# Patient Record
Sex: MALE | URBAN - METROPOLITAN AREA
[De-identification: names, ages, dates, MRNs, and addresses within clinical notes are randomized per-mention and may not be internally consistent; named-entity substitution may affect disease eponyms.]

---

## 2017-01-25 ENCOUNTER — OFFICE VISIT (OUTPATIENT)
Dept: HEMATOLOGY | Age: 57
End: 2017-01-25

## 2017-01-25 VITALS
HEIGHT: 75 IN | DIASTOLIC BLOOD PRESSURE: 73 MMHG | TEMPERATURE: 96.3 F | WEIGHT: 146.5 LBS | HEART RATE: 69 BPM | SYSTOLIC BLOOD PRESSURE: 98 MMHG | BODY MASS INDEX: 18.21 KG/M2 | OXYGEN SATURATION: 96 %

## 2017-01-25 DIAGNOSIS — B18.2 CHRONIC HEPATITIS C WITHOUT HEPATIC COMA (HCC): Primary | ICD-10-CM

## 2017-01-25 NOTE — MR AVS SNAPSHOT
Visit Information Date & Time Provider Department Dept. Phone Encounter #  
 1/25/2017  2:00 PM Adair Scruggs NP [facility] [address] [id number] Follow-up Instructions Return in about 8 weeks (around 3/22/2017). Your Appointments 3/22/2017  1:30 PM  
Follow Up with Adair Scruggs NP [facility] [address] ([facility] Appt Note: follow up [address] [facility] [place] [phone] [facility] [address]  
[address] [facility] [place] [address] Upcoming Health Maintenance Date Due Pneumococcal 19-64 Highest Risk (1 of 3 - PCV13) 6/16/1977 DTaP/Tdap/Td series (1 - Tdap) 6/16/1979 FOBT Q 1 YEAR AGE 50-75 6/16/2008 INFLUENZA AGE 9 TO ADULT 8/1/2016 Allergies as of 1/25/2017  Review Complete On: 1/25/2017 By: Adair Scruggs NP Severity Noted Reaction Type Reactions Demerol [Meperidine]  10/03/2016    Nausea and Vomiting Current Immunizations  Never Reviewed No immunizations on file. Not reviewed this visit You Were Diagnosed With   
  
 Codes Comments Chronic hepatitis C without hepatic coma (HCC)    -  Primary ICD-10-CM: B18.2 ICD-9-CM: 070.54 Vitals BP Pulse Temp Height(growth percentile) Weight(growth percentile) SpO2  
 98/73 69 96.3 °F (35.7 °C) (Oral) 6' 3\" (1.905 m) 146 lb 8 oz (66.5 kg) 96% BMI Smoking Status 18.31 kg/m2 Never Smoker BMI and BSA Data Body Mass Index Body Surface Area  
 18.31 kg/m 2 1.88 m 2 Your Updated Medication List  
  
   
This list is accurate as of: 1/25/17  2:24 PM.  Always use your most recent med list.  
  
  
  
  
 EFFEXOR XR 75 mg capsule Generic drug:  venlafaxine-SR Take  by mouth daily. methadone 40 mg soluble tablet Commonly known as:  Malia Ends Take 40 mg by mouth every four (4) hours as needed for Pain. VALIUM 5 mg tablet Generic drug:  diazePAM  
Take 5 mg by mouth every six (6) hours as needed for Anxiety. XANAX 2 mg tablet Generic drug:  ALPRAZolam  
Take  by mouth. We Performed the Following CBC WITH AUTOMATED DIFF [12648 CPT(R)] HCV, QT WITH GRAPH [id number] CPT(R)] HEPATIC FUNCTION PANEL [87601 CPT(R)] METABOLIC PANEL, BASIC [53801 CPT(R)] Follow-up Instructions Return in about 8 weeks (around 3/22/2017). Introducing [facility]! Sarah Webster introduces [employer] patient portal. Now you can access parts of your medical record, email your doctor's office, and request medication refills online. 1. In your internet browser, go to [email or web address] "[email or web address]/Share Your Brain 2. Click on the First Time User? Click Here link in the Sign In box. You will see the New Member Sign Up page. 3. Enter your [employer] Access Code exactly as it appears below. You will not need to use this code after youve completed the sign-up process. If you do not sign up before the expiration date, you must request a new code. · [employer] Access Code: [email or web address] Expires: 4/25/2017  2:23 PM 
 
4. Enter the last four digits of your Social Security Number (xxxx) and Date of Birth (mm/dd/yyyy) as indicated and click Submit. You will be taken to the next sign-up page. 5. Create a [employer] ID. This will be your [employer] login ID and cannot be changed, so think of one that is secure and easy to remember. 6. Create a [employer] password. You can change your password at any time. 7. Enter your Password Reset Question and Answer. This can be used at a later time if you forget your password. 8. Enter your e-mail address. You will receive e-mail notification when new information is available in [address] 9. Click Sign Up. You can now view and download portions of your medical record. 10. Click the Download Summary menu link to download a portable copy of your medical information. If you have questions, please visit the Frequently Asked Questions section of the [employer] website. Remember, [employer] is NOT to be used for urgent needs. For medical emergencies, dial 911. Now available from your iPhone and Android! Please provide this summary of care documentation to your next provider. Your primary care clinician is listed as [id number] Mara MCDOWELL. If you have any questions after today's visit, please call [phone].

## 2017-01-25 NOTE — PROGRESS NOTES
93 Saniya Chicas MD, RICK Morales PA-C Sloan Blade, MD, [place], Laymon Berkshire, MD Amy Maeola Romance, NP Erving Rinne, NP        [address] [facility], [address] [address]  22.     [phone]     FAX: 671 [facility], [address] [place] [address], [address]     [phone]     FAX: [phone]       Patient Care Team:  Susan Christy MD as PCP - General (Nephrology)      Problem List  Date Reviewed: 1/25/2017          Codes Class Noted    Chronic hepatitis C without hepatic coma [facility]) ICD-10-CM: B18.2  ICD-9-CM: 070.54  10/3/2016        TMJ (dislocation of temporomandibular joint) ICD-10-CM: S03. 00XA  ICD-9-CM: 830.0  10/3/2016        Migraine headache ICD-10-CM: X42.103  ICD-9-CM: 346.90  10/3/2016        Melanoma in situ of interscapular region [facility]) ICD-10-CM: D03.59  ICD-9-CM: 172.5  10/3/2016              Ilana Borges returns to the [place] [address] today for education and management of chronic hepatitis C. He began HCV on 12/27/2016 with once daily Harvoni. He will be treated for 8 weeks total.  He is receiving the treatment medication through the [facility] study program.  The HCV has not been treated before this. The active problem list, all pertinent past medical history, medications, liver histology, endoscopic studies, radiologic findings and laboratory findings related to the liver disorder were reviewed with the patient. The patient is a 60 y.o.  male who was noted to have abnormalities in liver chemistries and subsequently tested positive for chronic HCV in 1998. Risk factors for acquiring HCV are IV drug use in 1970 - 2003. There was no history of acute incteric hepatitis at the time of these risk factors. Imaging of the liver was not performed.       A liver biopsy has not been performed. In office fibroscan assessment suggests fairly mild disease, F1/F2, portal fibrosis      The most recent laboratory studies indicate that the liver transaminases are normal, alkaline phosphatase is normal, tests of hepatic synthetic and metabolic function are normal, and the platelet count is normal.      The patient has no complaints which can be attributed to liver disease. The patient completes all daily activities without any functional limitations. The patient has not experienced fatigue, fevers, chills, shortness of breath, chest pain, pain in the right side over the liver, diffuse abdominal pain, nausea, vomiting, constipation, diarrhrea, dry eyes, dry mouth, arthralgias, myalgias, yellowing of the eyes or skin, itching, dark urine, problems concentrating, swelling of the abdomen, swelling of the lower extremities, hematemesis, or hematochezia. ALLERGIES  Allergies   Allergen Reactions    Demerol [Meperidine] Nausea and Vomiting       MEDICATIONS  Current Outpatient Prescriptions   Medication Sig    diazepam (VALIUM) 5 mg tablet Take 5 mg by mouth every six (6) hours as needed for Anxiety.  ALPRAZolam (XANAX) 2 mg tablet Take  by mouth.  venlafaxine-SR (EFFEXOR XR) 75 mg capsule Take  by mouth daily.  methadone (METHADOSE) 40 mg soluble tablet Take 40 mg by mouth every four (4) hours as needed for Pain. No current facility-administered medications for this visit. SYSTEM REVIEW NOT RELATED TO LIVER DISEASE OR REVIEWED ABOVE:  Constitution systems: Negative for fever, chills, weight gain, weight loss. Eyes: Negative for visual changes. ENT: Negative for sore throat, painful swallowing. Respiratory: Negative for cough, hemoptysis, SOB. Cardiology: Negative for chest pain, palpitations. GI:  Negative for constipation or diarrhea. : Negative for urinary frequency, dysuria, hematuria, nocturia. Skin: Negative for rash.   Hematology: Negative for easy bruising, blood clots. Musculo-skelatal: Negative for back pain, muscle pain, weakness. Neurologic: Negative for headaches, dizziness, vertigo, memory problems not related to HE. Psychology: Negative for anxiety, depression. FAMILY HISTORY:  The father  of brain cancer. The mother  of heart disease. There is no family history of liver disease. SOCIAL HISTORY:  The patient is . The patient has 1 child. The patient stopped using tobacco products in . The patient has never consumed significant amounts of alcohol. The patient currently works part time as a . PHYSICAL EXAMINATION:  Visit Vitals    BP 98/73    Pulse 69    Temp 96.3 °F (35.7 °C) (Oral)    Ht 6' 3\" (1.905 m)    Wt 146 lb 8 oz (66.5 kg)    SpO2 96%    BMI 18.31 kg/m2     General: No acute distress. Eyes: Sclera anicteric. ENT: No oral lesions. Thyroid normal.  Nodes: No adenopathy. Skin: No spider angiomata. No jaundice. No palmar erythema. Respiratory: Lungs clear to auscultation. Cardiovascular: Regular heart rate. No murmurs. No JVD. Abdomen: Soft non-tender. Liver size normal to percussion/palpation. Spleen not palpable. No obvious ascites. Extremities: No edema. No muscle wasting. No gross arthritic changes. Neurologic: Alert and oriented. Cranial nerves grossly intact. No asterixis.     LABORATORY STUDIES:  [facility] Ref Rng 10/3/2016   WBC 3.4 - 10.8 x10E3/uL 8.1   ANC 1.4 - 7.0 x10E3/uL 4.0   HGB 12.6 - 17.7 g/dL 14.1    - 379 x10E3/uL 290   AST 0 - 40 IU/L 26   ALT 0 - 44 IU/L 27   Alk Phos 39 - 117 IU/L 109   Bili, Total 0.0 - 1.2 mg/dL 0.5   Bili, Direct 0.00 - 0.40 mg/dL 0.16   Albumin 3.5 - 5.5 g/dL 4.5   BUN 6 - 24 mg/dL 12   Creat 0.76 - 1.27 mg/dL 0.93   Na 134 - 144 mmol/L 142   K 3.5 - 5.2 mmol/L 5.0   Cl 97 - 108 mmol/L 97   CO2 18 - 29 mmol/L 29   Glucose 65 - 99 mg/dL 92     SEROLOGIES:  Serologies Latest Ref Rng 10/3/2016   Hep B Surface Ag Negative Negative   Hep C Genotype  1b   HCV RT-PCR, Quant IU/mL [id number]     LIVER HISTOLOGY:  10/2016. FibroScan performed at [address] EkPa was 7.9. Suggested fibrosis level is F1/F2. ENDOSCOPIC PROCEDURES:  Not available or performed    RADIOLOGY:  Not available or performed    OTHER TESTING:  Not available or performed    ASSESSMENT AND PLAN:  Chronic HCV with portal fibrosis. The most recent laboratory studies indicate that the liver transaminases are normal, alkaline phosphatase is elevated, tests of hepatic synthetic and metabolic function are normal, and the platelet count is normal. Will perform laboratory testing to monitor liver function and degree of liver injury. This will include hepatic panel, a CBC w/ diff, a BMP, and HCV RNA.    HCV. The patient has genotype 1B and has not been treated for HCV before. He began HCV on 12/27/2016 with once daily Harvoni. He will be treated for 8 weeks total.  He is receiving the treatment medication through the [facility] study program.  He has no treatment related complaints. In fact, he stated \"I feel better than I have in years\".    The patient was directed to continue all current medications at the current dosages. There are no contraindications for the patient to take any medications that are necessary for treatment of other medical issues. The patient was instructed not to start any PPI while on treatment. The patient was instructed not to take any antacids within 4 hours of taking the Harvoni. The patient verbalized understanding of these instructions.     The patient was counseled regarding alcohol consumption.      Vaccination for viral hepatitis B is not required. The patient has serologic evidence of prior exposure or vaccination with immunity.     All of the above issues were discussed with the patient. All questions were answered.  The patient expressed a clear understanding of the above.     Follow-up Fred Palmer 32 in 8 weeks to assess for SVR 4.      Iris Lopes, FNP-C   Liver [facility]/ [address], [facility], [facility], [address] [place] [address]  [phone]

## 2017-01-26 LAB
ALBUMIN SERPL-MCNC: 4 G/DL (ref 3.5–5.5)
ALP SERPL-CCNC: 101 IU/L (ref 39–117)
ALT SERPL-CCNC: 11 IU/L (ref 0–44)
AST SERPL-CCNC: 17 IU/L (ref 0–40)
BASOPHILS # BLD AUTO: 0 X10E3/UL (ref 0–0.2)
BASOPHILS NFR BLD AUTO: 0 %
BILIRUB DIRECT SERPL-MCNC: 0.09 MG/DL (ref 0–0.4)
BILIRUB SERPL-MCNC: <0.2 MG/DL (ref 0–1.2)
BUN SERPL-MCNC: 15 MG/DL (ref 6–24)
BUN/CREAT SERPL: 17 (ref 9–20)
CALCIUM SERPL-MCNC: 9.1 MG/DL (ref 8.7–10.2)
CHLORIDE SERPL-SCNC: 99 MMOL/L (ref 96–106)
CO2 SERPL-SCNC: 29 MMOL/L (ref 18–29)
CREAT SERPL-MCNC: 0.88 MG/DL (ref 0.76–1.27)
EOSINOPHIL # BLD AUTO: 0.3 X10E3/UL (ref 0–0.4)
EOSINOPHIL NFR BLD AUTO: 4 %
ERYTHROCYTE [DISTWIDTH] IN BLOOD BY AUTOMATED COUNT: 13.5 % (ref 12.3–15.4)
GLUCOSE SERPL-MCNC: 88 MG/DL (ref 65–99)
HCT VFR BLD AUTO: 38.4 % (ref 37.5–51)
HGB BLD-MCNC: 12.7 G/DL (ref 12.6–17.7)
IMM GRANULOCYTES # BLD: 0 X10E3/UL (ref 0–0.1)
IMM GRANULOCYTES NFR BLD: 0 %
LYMPHOCYTES # BLD AUTO: 2.7 X10E3/UL (ref 0.7–3.1)
LYMPHOCYTES NFR BLD AUTO: 35 %
MCH RBC QN AUTO: 29.2 PG (ref 26.6–33)
MCHC RBC AUTO-ENTMCNC: 33.1 G/DL (ref 31.5–35.7)
MCV RBC AUTO: 88 FL (ref 79–97)
MONOCYTES # BLD AUTO: 0.8 X10E3/UL (ref 0.1–0.9)
MONOCYTES NFR BLD AUTO: 11 %
NEUTROPHILS # BLD AUTO: 3.7 X10E3/UL (ref 1.4–7)
NEUTROPHILS NFR BLD AUTO: 50 %
PLATELET # BLD AUTO: 260 X10E3/UL (ref 150–379)
POTASSIUM SERPL-SCNC: 4.6 MMOL/L (ref 3.5–5.2)
PROT SERPL-MCNC: 6.7 G/DL (ref 6–8.5)
RBC # BLD AUTO: 4.35 X10E6/UL (ref 4.14–5.8)
SODIUM SERPL-SCNC: 142 MMOL/L (ref 134–144)
WBC # BLD AUTO: 7.6 X10E3/UL (ref 3.4–10.8)

## 2017-01-27 LAB
HCV RNA SERPL NAA+PROBE-ACNC: NORMAL IU/ML
TEST INFORMATION: NORMAL

## 2017-01-30 ENCOUNTER — TELEPHONE (OUTPATIENT)
Dept: HEMATOLOGY | Age: 57
End: 2017-01-30

## 2017-03-22 ENCOUNTER — OFFICE VISIT (OUTPATIENT)
Dept: HEMATOLOGY | Age: 57
End: 2017-03-22

## 2017-03-22 VITALS
BODY MASS INDEX: 17.35 KG/M2 | HEART RATE: 68 BPM | DIASTOLIC BLOOD PRESSURE: 70 MMHG | OXYGEN SATURATION: 98 % | WEIGHT: 138.8 LBS | SYSTOLIC BLOOD PRESSURE: 107 MMHG

## 2017-03-22 DIAGNOSIS — B18.2 CHRONIC HEPATITIS C WITHOUT HEPATIC COMA (HCC): Primary | ICD-10-CM

## 2017-03-22 NOTE — PROGRESS NOTES
Marichuy Gottlieb MD, RICK Conrad PA-C Jonas Pigg, MD, [address], Sandip Conrad, RICK Russ NP [address], [address] [address]     [address] [facility], [address].     [phone]     FAX: [address] [place] [address] Drive, [id number] [place] [address], [address]     [phone]     FAX: [phone]       Patient Care Team:  Nicki Zaman MD as PCP - General (Nephrology)      Problem List  Date Reviewed: 3/22/2017          Codes Class Noted    Chronic hepatitis C without hepatic coma [facility]) ICD-10-CM: B18.2  ICD-9-CM: 070.54  10/3/2016        TMJ (dislocation of temporomandibular joint) ICD-10-CM: S03. 00XA  ICD-9-CM: 830.0  10/3/2016        Migraine headache ICD-10-CM: I87.284  ICD-9-CM: 346.90  10/3/2016        Melanoma in situ of interscapular region [facility]) ICD-10-CM: D03.59  ICD-9-CM: 172.5  10/3/2016              Lorri Chacon returns to the [employer] today for education and management of chronic hepatitis C. He began HCV on 12/27/2016 with once daily Harvoni. He was treated for 8 weeks total and completed the regime on 02/20/2017. He is participating in the [address] [place] [address] study program.  The HCV has not been treated before this. The active problem list, all pertinent past medical history, medications, liver histology, endoscopic studies, radiologic findings and laboratory findings related to the liver disorder were reviewed with the patient. The patient is a 60 y.o.  male who was noted to have abnormalities in liver chemistries and subsequently tested positive for chronic HCV in 1998. Risk factors for acquiring HCV are IV drug use in 1970 - 2003. There was no history of acute incteric hepatitis at the time of these risk factors. Imaging of the liver was not performed.       A liver biopsy has not been performed. In office fibroscan assessment suggests fairly mild disease, F1/F2, portal fibrosis      The most recent laboratory studies indicate that the liver transaminases are normal, alkaline phosphatase is normal, tests of hepatic synthetic and metabolic function are normal, and the platelet count is normal.      The patient has no complaints which can be attributed to liver disease. The patient completes all daily activities without any functional limitations. The patient has not experienced fatigue, fevers, chills, shortness of breath, chest pain, pain in the right side over the liver, diffuse abdominal pain, nausea, vomiting, constipation, diarrhrea, dry eyes, dry mouth, arthralgias, myalgias, yellowing of the eyes or skin, itching, dark urine, problems concentrating, swelling of the abdomen, swelling of the lower extremities, hematemesis, or hematochezia. ALLERGIES  Allergies   Allergen Reactions    Demerol [Meperidine] Nausea and Vomiting       MEDICATIONS  Current Outpatient Prescriptions   Medication Sig    diazepam (VALIUM) 5 mg tablet Take 5 mg by mouth every six (6) hours as needed for Anxiety.  ALPRAZolam (XANAX) 2 mg tablet Take  by mouth.  venlafaxine-SR (EFFEXOR XR) 75 mg capsule Take  by mouth daily.  methadone (METHADOSE) 40 mg soluble tablet Take 40 mg by mouth every four (4) hours as needed for Pain. No current facility-administered medications for this visit. SYSTEM REVIEW NOT RELATED TO LIVER DISEASE OR REVIEWED ABOVE:  Constitution systems: Negative for fever, chills, weight gain, weight loss. Eyes: Negative for visual changes. ENT: Negative for sore throat, painful swallowing. Respiratory: Negative for cough, hemoptysis, SOB. Cardiology: Negative for chest pain, palpitations. GI:  Negative for constipation or diarrhea. : Negative for urinary frequency, dysuria, hematuria, nocturia. Skin: Negative for rash.   Hematology: Negative for easy bruising, blood clots. Musculo-skelatal: Negative for back pain, muscle pain, weakness. Neurologic: Negative for headaches, dizziness, vertigo, memory problems not related to HE. Psychology: Negative for anxiety, depression. FAMILY HISTORY:  The father  of brain cancer. The mother  of heart disease. There is no family history of liver disease. SOCIAL HISTORY:  The patient is . The patient has 1 child. The patient stopped using tobacco products in . The patient has never consumed significant amounts of alcohol. The patient currently works part time as a . PHYSICAL EXAMINATION:  Visit Vitals    /70    Pulse 68    Wt 138 lb 12.8 oz (63 kg)    SpO2 98%    BMI 17.35 kg/m2     General: No acute distress. Eyes: Sclera anicteric. ENT: No oral lesions. Thyroid normal.  Nodes: No adenopathy. Skin: No spider angiomata. No jaundice. No palmar erythema. Respiratory: Lungs clear to auscultation. Cardiovascular: Regular heart rate. No murmurs. No JVD. Abdomen: Soft non-tender. Liver size normal to percussion/palpation. Spleen not palpable. No obvious ascites. Extremities: No edema. No muscle wasting. No gross arthritic changes. Neurologic: Alert and oriented. Cranial nerves grossly intact. No asterixis.     LABORATORY STUDIES:  Liver [place] of [address] [place] [address] Units 2017 10/3/2016   WBC 3.4 - 10.8 x10E3/uL 7.6 8.1   ANC 1.4 - 7.0 x10E3/uL 3.7 4.0   HGB 12.6 - 17.7 g/dL 12.7 14.1    - 379 x10E3/uL 260 290   AST 0 - 40 IU/L 17 26   ALT 0 - 44 IU/L 11 27   Alk Phos 39 - 117 IU/L 101 109   Bili, Total 0.0 - 1.2 mg/dL <0.2 0.5   Bili, Direct 0.00 - 0.40 mg/dL 0.09 0.16   Albumin 3.5 - 5.5 g/dL 4.0 4.5   BUN 6 - 24 mg/dL 15 12   Creat 0.76 - 1.27 mg/dL 0.88 0.93   Na 134 - 144 mmol/L 142 142   K 3.5 - 5.2 mmol/L 4.6 5.0   Cl 96 - 106 mmol/L 99 97   CO2 18 - 29 mmol/L 29 29   Glucose 65 - 99 mg/dL 88 92     SEROLOGIES:  Serologies Latest Ref Rng 10/3/2016   Hep B Surface Ag Negative Negative   Hep C Genotype  1b   HCV RT-PCR, Quant IU/mL [id number]     LIVER HISTOLOGY:  10/2016. FibroScan performed at [address]. EkPa was 7.9. Suggested fibrosis level is F1/F2. ENDOSCOPIC PROCEDURES:  Not available or performed    RADIOLOGY:  Not available or performed    OTHER TESTING:  Not available or performed    ASSESSMENT AND PLAN:  Chronic HCV with portal fibrosis. The most recent laboratory studies indicate that the liver transaminases are normal, alkaline phosphatase is elevated, tests of hepatic synthetic and metabolic function are normal, and the platelet count is normal. Will perform laboratory testing to monitor liver function and degree of liver injury. This will include hepatic panel, a CBC w/ diff, a BMP, and HCV RNA.    HCV. The patient has genotype 1B and has not been treated for HCV before. He began HCV on 12/27/2016 with once daily Harvoni and was treated for 8 weeks total.  He completed the treatment regime on 02/20/2017. He had no treatment related complaints. In fact, he stated \"I feel better than I have in years\". He is participating in the [place] [address] study program.         The patient was directed to continue all current medications at the current dosages. There are no contraindications for the patient to take any medications that are necessary for treatment of other medical issues. The patient was instructed not to start any PPI while on treatment. The patient was instructed not to take any antacids within 4 hours of taking the Harvoni. The patient verbalized understanding of these instructions.     The patient was counseled regarding alcohol consumption.      Vaccination for viral hepatitis B is not required. The patient has serologic evidence of prior exposure or vaccination with immunity.     All of the above issues were discussed with the patient.  All questions were answered. The patient expressed a clear understanding of the above.     Follow-up Fred Palmer 32 in 8 weeks to assess for SVR 12.      Mavis Jacob, JUVENTINOP-C   Liver [place] [facility]/ [place] , [address]  [phone]

## 2017-03-22 NOTE — MR AVS SNAPSHOT
Visit Information Date & Time Provider Department Dept. Phone Encounter #  
 3/22/2017  1:30 PM Nicole Fuentes NP [facility] [address] [place] [address] [id number] Follow-up Instructions Return in about 8 weeks (around 5/17/2017). Upcoming Health Maintenance Date Due Pneumococcal 19-64 Highest Risk (1 of 3 - PCV13) 6/16/1977 DTaP/Tdap/Td series (1 - Tdap) 6/16/1979 FOBT Q 1 YEAR AGE 50-75 6/16/2008 INFLUENZA AGE 9 TO ADULT 8/1/2016 Allergies as of 3/22/2017  Review Complete On: 3/22/2017 By: Parth Raymond Severity Noted Reaction Type Reactions Demerol [Meperidine]  10/03/2016    Nausea and Vomiting Current Immunizations  Never Reviewed No immunizations on file. Not reviewed this visit You Were Diagnosed With   
  
 Codes Comments Chronic hepatitis C without hepatic coma (HCC)    -  Primary ICD-10-CM: B18.2 ICD-9-CM: 070.54 Vitals BP Pulse Weight(growth percentile) SpO2 BMI Smoking Status 107/70 68 138 lb 12.8 oz (63 kg) 98% 17.35 kg/m2 Never Smoker Vitals History BMI and BSA Data Body Mass Index Body Surface Area  
 17.35 kg/m 2 1.83 m 2 Your Updated Medication List  
  
   
This list is accurate as of: 3/22/17  1:52 PM.  Always use your most recent med list.  
  
  
  
  
 EFFEXOR XR 75 mg capsule Generic drug:  venlafaxine-SR Take  by mouth daily. methadone 40 mg soluble tablet Commonly known as:  Eric Mech Take 40 mg by mouth every four (4) hours as needed for Pain. VALIUM 5 mg tablet Generic drug:  diazePAM  
Take 5 mg by mouth every six (6) hours as needed for Anxiety. XANAX 2 mg tablet Generic drug:  ALPRAZolam  
Take  by mouth. We Performed the Following CBC WITH AUTOMATED DIFF [24903 CPT(R)] HCV, QT WITH GRAPH [id number] CPT(R)] HEPATIC FUNCTION PANEL [20602 CPT(R)] METABOLIC PANEL, BASIC [57341 CPT(R)] Follow-up Instructions Return in about 8 weeks (around 5/17/2017). Introducing [facility]! [address] introduces [employer] patient portal. Now you can access parts of your medical record, email your doctor's office, and request medication refills online. 1. In your internet browser, go to [email or web address] 2. Click on the First Time User? Click Here link in the Sign In box. You will see the New Member Sign Up page. 3. Enter your [employer] Access Code exactly as it appears below. You will not need to use this code after youve completed the sign-up process. If you do not sign up before the expiration date, you must request a new code. · [employer] Access Code: [email or web address] Expires: 4/25/2017  3:23 PM 
 
4. Enter the last four digits of your Social Security Number (xxxx) and Date of Birth (mm/dd/yyyy) as indicated and click Submit. You will be taken to the next sign-up page. 5. Create a [employer] ID. This will be your [employer] login ID and cannot be changed, so think of one that is secure and easy to remember. 6. Create a [employer] password. You can change your password at any time. 7. Enter your Password Reset Question and Answer. This can be used at a later time if you forget your password. 8. Enter your e-mail address. You will receive e-mail notification when new information is available in [address] 9. Click Sign Up. You can now view and download portions of your medical record. 10. Click the Download Summary menu link to download a portable copy of your medical information. If you have questions, please visit the Frequently Asked Questions section of the [employer] website. Remember, [employer] is NOT to be used for urgent needs. For medical emergencies, dial 911. Now available from your iPhone and Android! Please provide this summary of care documentation to your next provider. Your primary care clinician is listed as [id number] Jania MCDOWELL. If you have any questions after today's visit, please call [phone].

## 2017-03-23 LAB
ALBUMIN SERPL-MCNC: 4.2 G/DL (ref 3.5–5.5)
ALP SERPL-CCNC: 105 IU/L (ref 39–117)
ALT SERPL-CCNC: 17 IU/L (ref 0–44)
AST SERPL-CCNC: 19 IU/L (ref 0–40)
BASOPHILS # BLD AUTO: 0 X10E3/UL (ref 0–0.2)
BASOPHILS NFR BLD AUTO: 1 %
BILIRUB DIRECT SERPL-MCNC: 0.14 MG/DL (ref 0–0.4)
BILIRUB SERPL-MCNC: 0.5 MG/DL (ref 0–1.2)
BUN SERPL-MCNC: 15 MG/DL (ref 6–24)
BUN/CREAT SERPL: 15 (ref 9–20)
CALCIUM SERPL-MCNC: 9.7 MG/DL (ref 8.7–10.2)
CHLORIDE SERPL-SCNC: 98 MMOL/L (ref 96–106)
CO2 SERPL-SCNC: 24 MMOL/L (ref 18–29)
CREAT SERPL-MCNC: 1 MG/DL (ref 0.76–1.27)
EOSINOPHIL # BLD AUTO: 0.1 X10E3/UL (ref 0–0.4)
EOSINOPHIL NFR BLD AUTO: 1 %
ERYTHROCYTE [DISTWIDTH] IN BLOOD BY AUTOMATED COUNT: 13.1 % (ref 12.3–15.4)
GLUCOSE SERPL-MCNC: 111 MG/DL (ref 65–99)
HCT VFR BLD AUTO: 40.9 % (ref 37.5–51)
HCV RNA SERPL NAA+PROBE-ACNC: NORMAL IU/ML
HGB BLD-MCNC: 14.1 G/DL (ref 12.6–17.7)
IMM GRANULOCYTES # BLD: 0 X10E3/UL (ref 0–0.1)
IMM GRANULOCYTES NFR BLD: 0 %
LYMPHOCYTES # BLD AUTO: 3 X10E3/UL (ref 0.7–3.1)
LYMPHOCYTES NFR BLD AUTO: 37 %
MCH RBC QN AUTO: 29.8 PG (ref 26.6–33)
MCHC RBC AUTO-ENTMCNC: 34.5 G/DL (ref 31.5–35.7)
MCV RBC AUTO: 87 FL (ref 79–97)
MONOCYTES # BLD AUTO: 0.6 X10E3/UL (ref 0.1–0.9)
MONOCYTES NFR BLD AUTO: 8 %
NEUTROPHILS # BLD AUTO: 4.5 X10E3/UL (ref 1.4–7)
NEUTROPHILS NFR BLD AUTO: 53 %
PLATELET # BLD AUTO: 295 X10E3/UL (ref 150–379)
POTASSIUM SERPL-SCNC: 4.4 MMOL/L (ref 3.5–5.2)
PROT SERPL-MCNC: 7.2 G/DL (ref 6–8.5)
RBC # BLD AUTO: 4.73 X10E6/UL (ref 4.14–5.8)
SODIUM SERPL-SCNC: 141 MMOL/L (ref 134–144)
TEST INFORMATION: NORMAL
WBC # BLD AUTO: 8.3 X10E3/UL (ref 3.4–10.8)

## 2017-05-24 ENCOUNTER — OFFICE VISIT (OUTPATIENT)
Dept: HEMATOLOGY | Age: 57
End: 2017-05-24

## 2017-05-24 VITALS
SYSTOLIC BLOOD PRESSURE: 106 MMHG | OXYGEN SATURATION: 92 % | WEIGHT: 143.2 LBS | DIASTOLIC BLOOD PRESSURE: 55 MMHG | BODY MASS INDEX: 17.9 KG/M2 | HEART RATE: 64 BPM | TEMPERATURE: 98.4 F

## 2017-05-24 DIAGNOSIS — B18.2 CHRONIC HEPATITIS C WITHOUT HEPATIC COMA (HCC): Primary | ICD-10-CM

## 2017-05-24 NOTE — MR AVS SNAPSHOT
Visit Information Date & Time Provider Department Dept. Phone Encounter #  
 5/24/2017  1:30 PM Ignacio Allan Liver [facility] LISABRYAN [phone] [id number] Follow-up Instructions Return in about 6 months (around 11/24/2017) for Kishore Vick. Your Appointments 11/22/2017  1:30 PM  
[facility] Appt Note: 6 month F/u . Canary Malady [address] [facility] [place] [phone] Counts include 234 beds at the [facility] [address]  
[address] [place] [address] [facility] [address]  
  
    
 12/20/2017 11:30 AM  
Follow Up with [facility]) Appt Note: Follow up [address] [facility] [place] [phone] Counts include 234 beds at the [facility] [address]  
[facility] [address] Upcoming Health Maintenance Date Due Pneumococcal 19-64 Highest Risk (1 of 3 - PCV13) 6/16/1977 DTaP/Tdap/Td series (1 - Tdap) 6/16/1979 FOBT Q 1 YEAR AGE 50-75 6/16/2008 INFLUENZA AGE 9 TO ADULT 8/1/2017 Allergies as of 5/24/2017  Review Complete On: 3/22/2017 By: Chetnaa Jabari Severity Noted Reaction Type Reactions Demerol [Meperidine]  10/03/2016    Nausea and Vomiting Current Immunizations  Never Reviewed No immunizations on file. Not reviewed this visit You Were Diagnosed With   
  
 Codes Comments Chronic hepatitis C without hepatic coma (HCC)    -  Primary ICD-10-CM: B18.2 ICD-9-CM: 070.54 Vitals BP Pulse Temp Weight(growth percentile) SpO2 BMI  
 106/55 64 98.4 °F (36.9 °C) (Tympanic) 143 lb 3.2 oz (65 kg) 92% 17.9 kg/m2 Smoking Status Never Smoker BMI and BSA Data Body Mass Index Body Surface Area  
 17.9 kg/m 2 1.85 m 2 Your Updated Medication List  
  
   
This list is accurate as of: 5/24/17  2:20 PM.  Always use your most recent med list.  
  
  
  
  
 EFFEXOR XR 75 mg capsule Generic drug:  venlafaxine-SR Take  by mouth daily. methadone 40 mg soluble tablet Commonly known as:  Conrad Ales Take 40 mg by mouth every four (4) hours as needed for Pain. VALIUM 5 mg tablet Generic drug:  diazePAM  
Take 5 mg by mouth every six (6) hours as needed for Anxiety. XANAX 2 mg tablet Generic drug:  ALPRAZolam  
Take  by mouth. We Performed the Following CBC W/O DIFF [13505 CPT(R)] HCV RNA BY ALISTAIR QL,RFLX TO QT [20604 CPT(R)] HEPATIC FUNCTION PANEL (6) [[id number] Custom] METABOLIC PANEL, BASIC [58780 CPT(R)] REFERRAL TO GASTROENTEROLOGY [LYN54 Custom] Comments:  
 Please assess for first screening colonoscopy and ongoing weight loss. Follow-up Instructions Return in about 6 months (around 11/24/2017) for Halie Villa. Referral Information Referral ID Referred By Referred To  
  
 [id number] [facility] [address] [place] [address] [phone] [address] Visits Status Start Date End Date 1 New Request 5/24/17 5/24/18 If your referral has a status of pending review or denied, additional information will be sent to support the outcome of this decision. Introducing [facility]! Mynor Smith introduces [employer] patient portal. Now you can access parts of your medical record, email your doctor's office, and request medication refills online. 1. In your internet browser, go to [email or web address] 2. Click on the First Time User? Click Here link in the Sign In box. You will see the New Member Sign Up page. 3. Enter your [employer] Access Code exactly as it appears below. You will not need to use this code after youve completed the sign-up process. If you do not sign up before the expiration date, you must request a new code. · [employer] Access Code: [email or web address] Expires: 8/22/2017  2:20 PM 
 
 4. Enter the last four digits of your Social Security Number (xxxx) and Date of Birth (mm/dd/yyyy) as indicated and click Submit. You will be taken to the next sign-up page. 5. Create a [employer] ID. This will be your [employer] login ID and cannot be changed, so think of one that is secure and easy to remember. 6. Create a [employer] password. You can change your password at any time. 7. Enter your Password Reset Question and Answer. This can be used at a later time if you forget your password. 8. Enter your e-mail address. You will receive e-mail notification when new information is available in [address] 9. Click Sign Up. You can now view and download portions of your medical record. 10. Click the Download Summary menu link to download a portable copy of your medical information. If you have questions, please visit the Frequently Asked Questions section of the [employer] website. Remember, [employer] is NOT to be used for urgent needs. For medical emergencies, dial 911. Now available from your iPhone and Android! Please provide this summary of care documentation to your next provider. Your primary care clinician is listed as [address] If you have any questions after today's visit, please call [phone].

## 2017-05-24 NOTE — PROGRESS NOTES
93 Saniya Chicas MD, Lennox Hicks, NP Wash Leavens, PA-C Lucrecia Primmer, MD, Fidel Preciado MD Amy Antionette Drought, NP Rip Snuffer, NP        [address] [facility], [address] Alisia Harris  22.     [phone]     FAX: [address] [facility], [address] [place] [address], [address]     [phone]     FAX: [phone]       Patient Care Team:  Susan Chinchilla MD as PCP - General (Nephrology)      Problem List  Date Reviewed: 3/22/2017          Codes Class Noted    Chronic hepatitis C without hepatic coma [facility]) ICD-10-CM: B18.2  ICD-9-CM: 070.54  10/3/2016        TMJ (dislocation of temporomandibular joint) ICD-10-CM: S03. 00XA  ICD-9-CM: 830.0  10/3/2016        Migraine headache ICD-10-CM: T81.805  ICD-9-CM: 346.90  10/3/2016        Melanoma in situ of interscapular region [facility]) ICD-10-CM: D03.59  ICD-9-CM: 172.5  10/3/2016              Maria Leonard returns to the [place] [address] today for education and management of chronic hepatitis C. He recently concluded a full course of once daily Harvoni. He was treated for 8 weeks total and completed the regimen on 02/20/2017. He is participating in the [address] [place] [address] study program.   He presents today for determination of sustained viral response. The active problem list, all pertinent past medical history, medications, liver histology, endoscopic studies, radiologic findings and laboratory findings related to the liver disorder were reviewed with the patient. The patient is a 60 y.o.  male who was noted to have abnormalities in liver chemistries and subsequently tested positive for chronic HCV in 1998. Risk factors for acquiring HCV are IV drug use in 1970 - 2003. There was no history of acute incteric hepatitis at the time of these risk factors.       Imaging of the liver was not performed. A liver biopsy has not been performed. In office fibroscan assessment suggests fairly mild disease, F1/F2, portal fibrosis      Delta Dakins presents to the office for follow-up of chronic hepatitis C therapy. He has completed 8 weeks of Harvoni as of 12 weeks ago and presents to document his response to therapy. .  Patient has tolerated therapy reasonably well. There were no been missed medications. The most recent laboratory studies indicate that the liver transaminases are normal, alkaline phosphatase is normal, tests of hepatic synthetic and metabolic function are normal, and the platelet count is normal.      The patient has no complaints which can be attributed to liver disease. The patient completes all daily activities without any functional limitations. The patient has not experienced fatigue, fevers, chills, shortness of breath, chest pain, pain in the right side over the liver, diffuse abdominal pain, nausea, vomiting, constipation, diarrhrea, dry eyes, dry mouth, arthralgias, myalgias, yellowing of the eyes or skin, itching, dark urine, problems concentrating, swelling of the abdomen, swelling of the lower extremities, hematemesis, or hematochezia. Patient relates that he has had several years of weight losses due to poor appetite and mild nausea. He has noted that these symptoms improved significantly with the initiation of HCV medication and have continued to improve. His weight has stabilizes somewhat and he appetite is modestly stronger. Patient reports that he has had recent CXR, but that he has not had other routine testing like colonoscopy performed in the past.    ALLERGIES  Allergies   Allergen Reactions    Demerol [Meperidine] Nausea and Vomiting       MEDICATIONS  Current Outpatient Prescriptions   Medication Sig    ALPRAZolam (XANAX) 2 mg tablet Take  by mouth.  venlafaxine-SR (EFFEXOR XR) 75 mg capsule Take  by mouth daily.     methadone (METHADOSE) 40 mg soluble tablet Take 40 mg by mouth every four (4) hours as needed for Pain.  diazepam (VALIUM) 5 mg tablet Take 5 mg by mouth every six (6) hours as needed for Anxiety. No current facility-administered medications for this visit. SYSTEM REVIEW NOT RELATED TO LIVER DISEASE OR REVIEWED ABOVE:  Constitution systems: Negative for fever, chills, weight gain. Weight losses in the past several years. Eyes: Negative for visual changes. ENT: Negative for sore throat, painful swallowing. Respiratory: Negative for cough, hemoptysis, SOB. Cardiology: Negative for chest pain, palpitations. GI:  Negative for constipation or diarrhea. : Negative for urinary frequency, dysuria, hematuria, nocturia. Skin: Negative for rash. Hematology: Negative for easy bruising, blood clots. Musculo-skelatal: Negative for back pain, muscle pain, weakness. Neurologic: Negative for headaches, dizziness, vertigo, memory problems not related to HE. Psychology: Negative for anxiety, depression. FAMILY HISTORY:  The father  of brain cancer. The mother  of heart disease. There is no family history of liver disease. SOCIAL HISTORY:  The patient is . The patient has 1 child. The patient stopped using tobacco products in . The patient has never consumed significant amounts of alcohol. The patient currently works part time as a . PHYSICAL EXAMINATION:  Visit Vitals    /55    Pulse 64    Temp 98.4 °F (36.9 °C) (Tympanic)    Wt 143 lb 3.2 oz (65 kg)    SpO2 92%    BMI 17.9 kg/m2     General: No acute distress. Eyes: Sclera anicteric. ENT: No oral lesions. Thyroid normal.  Nodes: No adenopathy. Skin: No spider angiomata. No jaundice. No palmar erythema. Respiratory: Lungs clear to auscultation. Cardiovascular: Regular heart rate. No murmurs. No JVD. Abdomen: Soft non-tender. Liver size normal to percussion/palpation. Spleen not palpable.  No obvious ascites. Extremities: No edema. No muscle wasting. No gross arthritic changes. Neurologic: Alert and oriented. Cranial nerves grossly intact. No asterixis. LABORATORY STUDIES:  [facility] [place] [facility] 3/22/2017 1/25/2017 10/3/2016   WBC 3.4 - 10.8 x10E3/uL 8.3 7.6 8.1   ANC 1.4 - 7.0 x10E3/uL 4.5 3.7 4.0   HGB 12.6 - 17.7 g/dL 14.1 12.7 14.1    - 379 x10E3/uL 295 260 290   AST 0 - 40 IU/L 19 17 26   ALT 0 - 44 IU/L 17 11 27   Alk Phos 39 - 117 IU/L 105 101 109   Bili, Total 0.0 - 1.2 mg/dL 0.5 <0.2 0.5   Bili, Direct 0.00 - 0.40 mg/dL 0.14 0.09 0.16   Albumin 3.5 - 5.5 g/dL 4.2 4.0 4.5   BUN 6 - 24 mg/dL 15 15 12   Creat 0.76 - 1.27 mg/dL 1.00 0.88 0.93   Na 134 - 144 mmol/L 141 142 142   K 3.5 - 5.2 mmol/L 4.4 4.6 5.0   Cl 96 - 106 mmol/L 98 99 97   CO2 18 - 29 mmol/L 24 29 29   Glucose 65 - 99 mg/dL 111 (H) 88 92   Additional lab values drawn at today's office visit are pending at the time of documentation. SEROLOGIES:  Serologies Latest Ref Rng 10/3/2016   Hep B Surface Ag Negative Negative   Hep C Genotype  1b   HCV RT-PCR, Quant IU/mL [id number]     LIVER HISTOLOGY:  10/2016. FibroScan performed at [facility]. EkPa was 7.9. Suggested fibrosis level is F1/F2. ENDOSCOPIC PROCEDURES:  Not available or performed    RADIOLOGY:  Not available or performed    OTHER TESTING:  Not available or performed    ASSESSMENT AND PLAN:  Chronic hepatitis C, genotype 1b, in the setting of portal fibrosis. Melissa Geronimo tolerated medication for treatment of HCV well and has completed 8 weeks of Harvoni as of 3 months ago. Side effects of the oral treatment were minimal and he reports feeling well at this time. I have reviewed with him our plan to document his post-treatment response, if he remains undetected at this time, he will have satisfied medical definition of cure or sustained response. We would then have him return in 6 months for verification.   He is in agreement with this plan. Lab values today for monitoring purposes will include basic metabolic panel, hepatic function panel, CBC, and HCV RNA. Screening studies. I have reviewed with the patient recommendation regarding first screening colonoscopy, particularly in light of some of his recent weight losses and have made referral to GI services for initiation of screening. He is agreeable with this plan. The patient was directed to continue all current medications at the current dosages. There are no contraindications for the patient to take any medications that are necessary for treatment of other medical issues. The patient was counseled regarding alcohol consumption.      Vaccination for viral hepatitis B is not required. The patient has serologic evidence of prior exposure or vaccination with immunity.     All of the above issues were discussed with the patient. All questions were answered. The patient expressed a clear understanding of the above.  Norman Austin in 6 months.     Roxann Gibson PA-C  Liver [place] [address] [address] [facility] 22.  [phone]

## 2017-05-25 LAB
ALBUMIN SERPL-MCNC: 3.9 G/DL (ref 3.5–5.5)
ALP SERPL-CCNC: 86 IU/L (ref 39–117)
ALT SERPL-CCNC: 16 IU/L (ref 0–44)
AST SERPL-CCNC: 20 IU/L (ref 0–40)
BILIRUB DIRECT SERPL-MCNC: 0.11 MG/DL (ref 0–0.4)
BILIRUB SERPL-MCNC: 0.4 MG/DL (ref 0–1.2)
BUN SERPL-MCNC: 13 MG/DL (ref 6–24)
BUN/CREAT SERPL: 14 (ref 9–20)
CALCIUM SERPL-MCNC: 9 MG/DL (ref 8.7–10.2)
CHLORIDE SERPL-SCNC: 98 MMOL/L (ref 96–106)
CO2 SERPL-SCNC: 24 MMOL/L (ref 18–29)
CREAT SERPL-MCNC: 0.9 MG/DL (ref 0.76–1.27)
ERYTHROCYTE [DISTWIDTH] IN BLOOD BY AUTOMATED COUNT: 14 % (ref 12.3–15.4)
GLUCOSE SERPL-MCNC: 76 MG/DL (ref 65–99)
HCT VFR BLD AUTO: 36.9 % (ref 37.5–51)
HGB BLD-MCNC: 12.6 G/DL (ref 12.6–17.7)
MCH RBC QN AUTO: 29.5 PG (ref 26.6–33)
MCHC RBC AUTO-ENTMCNC: 34.1 G/DL (ref 31.5–35.7)
MCV RBC AUTO: 86 FL (ref 79–97)
PLATELET # BLD AUTO: 243 X10E3/UL (ref 150–379)
POTASSIUM SERPL-SCNC: 4.5 MMOL/L (ref 3.5–5.2)
RBC # BLD AUTO: 4.27 X10E6/UL (ref 4.14–5.8)
SODIUM SERPL-SCNC: 141 MMOL/L (ref 134–144)
WBC # BLD AUTO: 7.9 X10E3/UL (ref 3.4–10.8)

## 2017-05-28 LAB — HCV RNA SERPL QL NAA+PROBE: NEGATIVE

## 2017-09-21 ENCOUNTER — TELEPHONE (OUTPATIENT)
Dept: HEMATOLOGY | Age: 57
End: 2017-09-21

## 2017-09-21 NOTE — TELEPHONE ENCOUNTER
Called the patient to reschedule appt 11/22/17; LVM for the pt in full detail to call back to schedule, letter sent

## 2017-10-25 ENCOUNTER — TELEPHONE (OUTPATIENT)
Dept: HEMATOLOGY | Age: 57
End: 2017-10-25

## 2017-10-25 NOTE — TELEPHONE ENCOUNTER
Called the pt to reschedule appt 11/22/17 provider will be OOO, no answer LVM for the pt to call back to schedule an apptl; letter sent

## 2017-12-08 ENCOUNTER — OFFICE VISIT (OUTPATIENT)
Dept: HEMATOLOGY | Age: 57
End: 2017-12-08

## 2017-12-08 VITALS
SYSTOLIC BLOOD PRESSURE: 118 MMHG | BODY MASS INDEX: 19.07 KG/M2 | OXYGEN SATURATION: 99 % | DIASTOLIC BLOOD PRESSURE: 42 MMHG | WEIGHT: 152.6 LBS | TEMPERATURE: 97.5 F | HEART RATE: 64 BPM

## 2017-12-08 DIAGNOSIS — B18.2 CHRONIC HEPATITIS C WITHOUT HEPATIC COMA (HCC): Primary | ICD-10-CM

## 2017-12-08 NOTE — PROGRESS NOTES
Pt stated that he on occasions takes Klonopin that is NOT prescribed to him to help him with his nerves

## 2017-12-08 NOTE — PROGRESS NOTES
Patient presents for a f/u  1. Have you been to the ER, urgent care clinic since your last visit? Hospitalized since your last visit? No    2. Have you seen or consulted any other health care providers outside of the [address] [place] [address] since your last visit? Include any pap smears or colon screening.  No  Visit Vitals    /42    Pulse 64    Temp 97.5 °F (36.4 °C) (Oral)    Wt 152 lb 9.6 oz (69.2 kg)    SpO2 99%    BMI 19.07 kg/m2     PHQ over the last two weeks 12/8/2017   Little interest or pleasure in doing things Not at all   Feeling down, depressed or hopeless Not at all   Total Score PHQ 2 0     Learning Assessment 12/8/2017   PRIMARY LEARNER Patient   PRIMARY LANGUAGE ENGLISH   LEARNER PREFERENCE PRIMARY LISTENING   ANSWERED BY patient    RELATIONSHIP SELF

## 2017-12-08 NOTE — PROGRESS NOTES
93 Saniya Chicas MD, RICK Nixon PA-C Guinevere Nice, MD, Pete Wong MD Amy Alyne Crook, NP Buckner Jest, NP        [address] [facility], [address] [address]     [phone]     FAX: 959 [facility], [address] [place] [address], [address]     [phone]     FAX: [phone]       Patient Care Team:  Micaela Horn MD as PCP - General (Nephrology)      Problem List  Date Reviewed: 5/24/2017          Codes Class Noted    Chronic hepatitis C without hepatic coma [facility]) ICD-10-CM: B18.2  ICD-9-CM: 070.54  10/3/2016        TMJ (dislocation of temporomandibular joint) ICD-10-CM: S03. 00XA  ICD-9-CM: 830.0  10/3/2016        Migraine headache ICD-10-CM: T44.893  ICD-9-CM: 346.90  10/3/2016        Melanoma in situ of interscapular region [facility]) ICD-10-CM: D03.59  ICD-9-CM: 172.5  10/3/2016              Greg Rosales returns to the [address] for education and management of chronic hepatitis C. He concluded a full course of once daily [place] in 2/2017 and has been shown previously at 3 months post-treatment to have achieved a sustained response. The active problem list, all pertinent past medical history, medications, liver histology, endoscopic studies, radiologic findings and laboratory findings related to the liver disorder were reviewed with the patient. The patient is a 59 y.o.  male who was noted to have abnormalities in liver chemistries and subsequently tested positive for chronic HCV in 1998. Risk factors for acquiring HCV are IV drug use in 1970 - 2003. There was no history of acute incteric hepatitis at the time of these risk factors. Imaging of the liver was not performed. A liver biopsy has not been performed.   In office fibroscan assessment suggests fairly mild disease, F1/F2, portal fibrosis      Adore Jeff presents to the office for follow-up of chronic hepatitis C therapy. He has completed 8 weeks of Sandrine Stage as of 2/2017. The most recent laboratory studies indicate that the liver transaminases are normal, alkaline phosphatase is normal, tests of hepatic synthetic and metabolic function are normal, and the platelet count is normal.      The patient has no complaints which can be attributed to liver disease. The patient completes all daily activities without any functional limitations. The patient has not experienced fatigue, fevers, chills, shortness of breath, chest pain, pain in the right side over the liver, diffuse abdominal pain, nausea, vomiting, constipation, diarrhrea, dry eyes, dry mouth, arthralgias, myalgias, yellowing of the eyes or skin, itching, dark urine, problems concentrating, swelling of the abdomen, swelling of the lower extremities, hematemesis, or hematochezia. Patient states that he has weaned himself off of benzodiazepines and Effexor over the course of the past several months. He was concerned that there was some issue with continuing this medication in the setting of liver problem. He is not doing well at this time and is having significant anxiety symptoms. He has no follow-up with psychiatry or PCP. He remains on methadone. ALLERGIES  Allergies   Allergen Reactions    Demerol [Meperidine] Nausea and Vomiting       MEDICATIONS  Current Outpatient Prescriptions   Medication Sig    methadone (METHADOSE) 40 mg soluble tablet Take 40 mg by mouth every four (4) hours as needed for Pain.  diazepam (VALIUM) 5 mg tablet Take 5 mg by mouth every six (6) hours as needed for Anxiety.  ALPRAZolam (XANAX) 2 mg tablet Take  by mouth.  venlafaxine-SR (EFFEXOR XR) 75 mg capsule Take  by mouth daily. No current facility-administered medications for this visit.         SYSTEM REVIEW NOT RELATED TO LIVER DISEASE OR REVIEWED ABOVE:  Constitution systems: Negative for fever, chills, weight gain. Weight losses in the past several years. Eyes: Negative for visual changes. ENT: Negative for sore throat, painful swallowing. Respiratory: Negative for cough, hemoptysis, SOB. Cardiology: Negative for chest pain, palpitations. GI:  Negative for constipation or diarrhea. : Negative for urinary frequency, dysuria, hematuria, nocturia. Skin: Negative for rash. Hematology: Negative for easy bruising, blood clots. Musculo-skelatal: Negative for back pain, muscle pain, weakness. Neurologic: Negative for headaches, dizziness, vertigo, memory problems not related to HE. Psychology: Negative for anxiety, depression. FAMILY HISTORY:  The father  of brain cancer. The mother  of heart disease. There is no family history of liver disease. SOCIAL HISTORY:  The patient is . The patient has 1 child. The patient stopped using tobacco products in . The patient has never consumed significant amounts of alcohol. The patient currently works part time as a . PHYSICAL EXAMINATION:  Visit Vitals    /42    Pulse 64    Temp 97.5 °F (36.4 °C) (Oral)    Wt 152 lb 9.6 oz (69.2 kg)    SpO2 99%    BMI 19.07 kg/m2     General: No acute distress. Eyes: Sclera anicteric. ENT: No oral lesions. Thyroid normal.  Nodes: No adenopathy. Skin: No spider angiomata. No jaundice. No palmar erythema. Respiratory: Lungs clear to auscultation. Cardiovascular: Regular heart rate. No murmurs. No JVD. Abdomen: Soft non-tender. Liver size normal to percussion/palpation. Spleen not palpable. No obvious ascites. Extremities: No edema. No muscle wasting. No gross arthritic changes. Neurologic: Alert and oriented. Cranial nerves grossly intact. No asterixis.     LABORATORY STUDIES:  Liver [place] of [address] [place] [address] & Units 3/22/2017 2017 10/3/2016   WBC 3.4 - 10.8 x10E3/uL 8.3 7.6 8.1   ANC 1.4 - 7.0 x10E3/uL 4.5 3.7 4.0   HGB 12.6 - 17.7 g/dL 14.1 12.7 14.1    - 379 x10E3/uL 295 260 290   AST 0 - 40 IU/L 19 17 26   ALT 0 - 44 IU/L 17 11 27   Alk Phos 39 - 117 IU/L 105 101 109   Bili, Total 0.0 - 1.2 mg/dL 0.5 <0.2 0.5   Bili, Direct 0.00 - 0.40 mg/dL 0.14 0.09 0.16   Albumin 3.5 - 5.5 g/dL 4.2 4.0 4.5   BUN 6 - 24 mg/dL 15 15 12   Creat 0.76 - 1.27 mg/dL 1.00 0.88 0.93   Na 134 - 144 mmol/L 141 142 142   K 3.5 - 5.2 mmol/L 4.4 4.6 5.0   Cl 96 - 106 mmol/L 98 99 97   CO2 18 - 29 mmol/L 24 29 29   Glucose 65 - 99 mg/dL 111 (H) 88 92   Additional lab values drawn at today's office visit are pending at the time of documentation. SEROLOGIES:  Serologies Latest Ref Rng 10/3/2016   Hep B Surface Ag Negative Negative   Hep C Genotype  1b   HCV RT-PCR, Quant IU/mL [id number]     LIVER HISTOLOGY:  10/2016. [facility]. EkPa was 7.9. Suggested fibrosis level is F1/F2. ENDOSCOPIC PROCEDURES:  Not available or performed    RADIOLOGY:  Not available or performed    OTHER TESTING:  Not available or performed    ASSESSMENT AND PLAN:  Chronic hepatitis C, genotype 1b, in the setting of portal fibrosis. Loi Khanna tolerated medication for treatment of HCV well and completed 8 weeks of Harvoni as of 2/2017. Past labs have shown sustained response at 5/2017 visit indicating sustained response. Patient would like to defer any additional lab testing at this time as he has concerns regarding lab bills. He denies concern for repeat exposures to HCV. I have reviewed with him that he will continue to test positive for HCV Ab, but that this is not a protective antibody and that he could contract HCV again if exposed. We also discussed the importance of maintaining healthy weight and avoid fatty liver or alcohol injury in the future.   I have recommended that he continue follow-up with her PCP for routine monitoring and if he has future elevation of liver enzymes, we would be happy to see him back in follow-up. He otherwise will be discharged from the practice at this point. He is in agreement with this plan. No lab values were drawn today at the patient's request.    Anxiety. I have reviewed with the patient that it is absolutely okay from a liver standpoint to restart anxiolytic medications as appropriate. I have stressed the importance of re-establishing with PCP or psychiatrist as soon as possible. The patient was directed to continue all current medications at the current dosages. There are no contraindications for the patient to take any medications that are necessary for treatment of other medical issues. The patient was counseled regarding alcohol consumption.      Vaccination for viral hepatitis B is not required. The patient has serologic evidence of prior exposure or vaccination with immunity.     All of the above issues were discussed with the patient. All questions were answered. The patient expressed a clear understanding of the above.     Follow-up Fred Palmer 32 prn only.     Amy Sidhu PA-C  Liver [place] [address], [address] [address]  [address].  [phone]

## 2019-08-13 ENCOUNTER — OFFICE VISIT (OUTPATIENT)
Dept: HEMATOLOGY | Age: 59
End: 2019-08-13

## 2019-08-13 VITALS
WEIGHT: 133 LBS | BODY MASS INDEX: 16.62 KG/M2 | HEART RATE: 73 BPM | DIASTOLIC BLOOD PRESSURE: 76 MMHG | SYSTOLIC BLOOD PRESSURE: 106 MMHG | OXYGEN SATURATION: 98 % | TEMPERATURE: 98 F

## 2019-08-13 DIAGNOSIS — B18.2 CHRONIC HEPATITIS C WITHOUT HEPATIC COMA (HCC): Primary | ICD-10-CM

## 2019-08-13 NOTE — PROGRESS NOTES
[address] [place] [address], [place]nod[place] ABEL, Anastacio Ward MD Rexford Carota, PA-C Hildreth Elder, [facility]     Annita CHUN Damion, [facility]   GEOVANI Finch-ROZ Ocampo, [facility]       [facility] Count includes the [facility] 136    at [address], [address] [place] [address], [facility] 22.    [phone]    FAX: [address] [place] [address], [address]    [phone]    FAX: [phone]       Patient Care Team:  Claudeen Needles, MD as PCP - General (Nephrology)      Problem List  Date Reviewed: 12/8/2017          Codes Class Noted    Chronic hepatitis C without hepatic coma [facility]) ICD-10-CM: B18.2  ICD-9-CM: 070.54  10/3/2016        TMJ (dislocation of temporomandibular joint) ICD-10-CM: S03. 00XA  ICD-9-CM: 830.0  10/3/2016        Migraine headache ICD-10-CM: C65.493  ICD-9-CM: 346.90  10/3/2016        Melanoma in situ of interscapular region [facility]) ICD-10-CM: D03.59  ICD-9-CM: 172.5  10/3/2016              Rui Felton returns to the [address] for management of chronic HCV. The active problem list, all pertinent past medical history, medications, radiologic findings and laboratory findings related to the liver disorder were reviewed with the patient. The patient is a 62 y.o.  male who tested positive for chronic HCV in 1998. HCV was treated with Clarance Flower in 2/2017 and has achieved a SVR/cure. A Fibroscan was performed in 10/2016. This suggested stage 1-2 fibrosis      The patient has notes a 20 pound weight loss. He has never had colonoscopy to screen for colon cancer.     The patient has not experienced fatigue, pain in the right side over the liver, diffuse abdominal pain, diarrhea, constipation,    The patient completes all daily activities without any functional limitations. He remains on methadone maintenance. ASSESSMENT AND PLAN:  Chronic HCV Treatment  The patient has been treated for HCV with Harvoni (sofasbuvir and ledipasvir)   The patient has achieved sustained virologic response/cure. The last HCV RNA was undetectable in 8/2019. No further testing for HCV is necessary. Chronic HCV   Chronic HCV with stage 1-2 fibrosis. Severity of the liver disease was assessed by elastography in 10/2016. Liver transaminases are normal.  ALP is normal.  Liver function is normal.  The platelet count is normal.    Now that HCV is cured all fibrosis should resolve. Anxiety  He has severe anxiety which has not been addressed. He was encouraged to re-establish care with PCP or psychiatrist     Weight loss  The patient was encouraged to return to see his PCP regarding weight loss. He has never had a colonoscopy and told he needs screening for colon cancer. Treatment of other medical problems in patients with chronic liver disease  There are no contraindications for the patient to take most medications that are necessary for treatment of other medical issues. Counseling for alcohol in patients with chronic liver disease  The patient was counseled regarding alcohol consumption and the effect of alcohol on chronic liver disease.     Vaccinations   Vaccination for viral hepatitis B is not needed. The patient has serologic evidence of prior exposure or vaccination with immunity. Routine vaccinations against other bacterial and viral agents can be performed as indicated. Annual flu vaccination should be administered if indicated.         ALLERGIES  Allergies   Allergen Reactions    Demerol [Meperidine] Nausea and Vomiting       MEDICATIONS  Current Outpatient Medications   Medication Sig    methadone (METHADOSE) 40 mg soluble tablet Take 40 mg by mouth every four (4) hours as needed for Pain.      No current facility-administered medications for this visit. SYSTEM REVIEW NOT RELATED TO LIVER DISEASE OR REVIEWED ABOVE:  Constitution systems: Negative for fever, chills, weight gain. Weight losses in the past several years. Eyes: Negative for visual changes. ENT: Negative for sore throat, painful swallowing. Respiratory: Negative for cough, hemoptysis, SOB. Cardiology: Negative for chest pain, palpitations. GI:  Negative for constipation or diarrhea. : Negative for urinary frequency, dysuria, hematuria, nocturia. Skin: Negative for rash. Hematology: Negative for easy bruising, blood clots. Musculo-skelatal: Negative for back pain, muscle pain, weakness. Neurologic: Negative for headaches, dizziness, vertigo, memory problems not related to HE. Psychology: Negative for anxiety, depression. FAMILY HISTORY:  The father  of brain cancer. The mother  of heart disease. There is no family history of liver disease. SOCIAL HISTORY:  The patient is . The patient has 1 child. The patient stopped using tobacco products in . The patient has never consumed significant amounts of alcohol. The patient currently works part time as a . PHYSICAL EXAMINATION:  Visit Vitals  /76 (BP 1 Location: Left leg, BP Patient Position: Sitting)   Pulse 73   Temp 98 °F (36.7 °C) (Tympanic)   Wt 133 lb (60.3 kg)   SpO2 98%   BMI 16.62 kg/m²     General: No acute distress. Eyes: Sclera anicteric. ENT: No oral lesions. Thyroid normal.  Nodes: No adenopathy. Skin: No spider angiomata. No jaundice. No palmar erythema. Respiratory: Lungs clear to auscultation. Cardiovascular: Regular heart rate. No murmurs. No JVD. Abdomen: Soft non-tender. Liver size normal to percussion/palpation. Spleen not palpable. No obvious ascites. Extremities: No edema. No muscle wasting. No gross arthritic changes. Neurologic: Alert and oriented.   Cranial nerves grossly intact. No asterixis. LABORATORY STUDIES:  Liver [place] of [address] & Units 3/22/2017 1/25/2017 10/3/2016   WBC 3.4 - 10.8 x10E3/uL 8.3 7.6 8.1   ANC 1.4 - 7.0 x10E3/uL 4.5 3.7 4.0   HGB 12.6 - 17.7 g/dL 14.1 12.7 14.1    - 379 x10E3/uL 295 260 290   AST 0 - 40 IU/L 19 17 26   ALT 0 - 44 IU/L 17 11 27   Alk Phos 39 - 117 IU/L 105 101 109   Bili, Total 0.0 - 1.2 mg/dL 0.5 <0.2 0.5   Bili, Direct 0.00 - 0.40 mg/dL 0.14 0.09 0.16   Albumin 3.5 - 5.5 g/dL 4.2 4.0 4.5   BUN 6 - 24 mg/dL 15 15 12   Creat 0.76 - 1.27 mg/dL 1.00 0.88 0.93   Na 134 - 144 mmol/L 141 142 142   K 3.5 - 5.2 mmol/L 4.4 4.6 5.0   Cl 96 - 106 mmol/L 98 99 97   CO2 18 - 29 mmol/L 24 29 29   Glucose 65 - 99 mg/dL 111 (H) 88 92     SEROLOGIES:  Serologies Latest Ref Rng 10/3/2016   Hep B Surface Ag Negative Negative   Hep C Genotype  1b   HCV RT-PCR, Quant IU/mL [id number]     Serologies Latest Ref Rng & Units 3/22/2017 1/25/2017   HCV RT-PCR, Quant IU/mL HCV Not Detected HCV Not Detected     8/2019. HCV RNA undetectable    LIVER HISTOLOGY:  10/2016. Fib[facility]Scan performed at [facility]. EkPa was 7.9. Suggested fibrosis level is F1/F2. ENDOSCOPIC PROCEDURES:  Not available or performed    RADIOLOGY:  Not available or performed    OTHER TESTING:  Not available or performed    FOLLOW-UP:  All of the issues listed above in the Assessment and Plan were discussed with the patient. All questions were answered. The patient expressed a clear understanding of the above. No follow-up at [facility] is needed. I would be glad to see the patient back for follow-up at any time in the future if the clinical situation changes.     MD Deyanira Estes[address], [address] [place] [address] 22.  [phone]  [address]

## 2019-08-13 NOTE — PROGRESS NOTES
1. Have you been to the ER, urgent care clinic since your last visit? Hospitalized since your last visit?no    2. Have you seen or consulted any other health care providers outside of the [address] [place] [address] since your last visit? Include any pap smears or colon screening.  No  .  Chief Complaint   Patient presents with    Follow-up     Visit Vitals  /76 (BP 1 Location: Left leg, BP Patient Position: Sitting)   Pulse 73   Temp 98 °F (36.7 °C) (Tympanic)   Wt 133 lb (60.3 kg)   SpO2 98%   BMI 16.62 kg/m²

## 2019-08-14 LAB
ALBUMIN SERPL-MCNC: 4.5 G/DL (ref 3.6–4.8)
ALBUMIN/GLOB SERPL: 1.5 {RATIO} (ref 1.2–2.2)
ALP SERPL-CCNC: 99 IU/L (ref 39–117)
ALT SERPL-CCNC: 16 IU/L (ref 0–44)
AST SERPL-CCNC: 18 IU/L (ref 0–40)
BASOPHILS # BLD AUTO: 0.1 X10E3/UL (ref 0–0.2)
BASOPHILS NFR BLD AUTO: 1 %
BILIRUB SERPL-MCNC: 0.6 MG/DL (ref 0–1.2)
BUN SERPL-MCNC: 15 MG/DL (ref 8–27)
BUN/CREAT SERPL: 16 (ref 10–24)
CALCIUM SERPL-MCNC: 9.7 MG/DL (ref 8.6–10.2)
CHLORIDE SERPL-SCNC: 99 MMOL/L (ref 96–106)
CO2 SERPL-SCNC: 26 MMOL/L (ref 20–29)
CREAT SERPL-MCNC: 0.96 MG/DL (ref 0.76–1.27)
EOSINOPHIL # BLD AUTO: 0.2 X10E3/UL (ref 0–0.4)
EOSINOPHIL NFR BLD AUTO: 2 %
ERYTHROCYTE [DISTWIDTH] IN BLOOD BY AUTOMATED COUNT: 13.7 % (ref 12.3–15.4)
GLOBULIN SER CALC-MCNC: 3 G/DL (ref 1.5–4.5)
GLUCOSE SERPL-MCNC: 72 MG/DL (ref 65–99)
HCT VFR BLD AUTO: 40 % (ref 37.5–51)
HGB BLD-MCNC: 13.5 G/DL (ref 13–17.7)
IMM GRANULOCYTES # BLD AUTO: 0 X10E3/UL (ref 0–0.1)
IMM GRANULOCYTES NFR BLD AUTO: 0 %
LYMPHOCYTES # BLD AUTO: 2.8 X10E3/UL (ref 0.7–3.1)
LYMPHOCYTES NFR BLD AUTO: 39 %
MCH RBC QN AUTO: 29.7 PG (ref 26.6–33)
MCHC RBC AUTO-ENTMCNC: 33.8 G/DL (ref 31.5–35.7)
MCV RBC AUTO: 88 FL (ref 79–97)
MONOCYTES # BLD AUTO: 0.6 X10E3/UL (ref 0.1–0.9)
MONOCYTES NFR BLD AUTO: 9 %
NEUTROPHILS # BLD AUTO: 3.6 X10E3/UL (ref 1.4–7)
NEUTROPHILS NFR BLD AUTO: 49 %
PLATELET # BLD AUTO: 309 X10E3/UL (ref 150–450)
POTASSIUM SERPL-SCNC: 4.4 MMOL/L (ref 3.5–5.2)
PROT SERPL-MCNC: 7.5 G/DL (ref 6–8.5)
RBC # BLD AUTO: 4.55 X10E6/UL (ref 4.14–5.8)
SODIUM SERPL-SCNC: 141 MMOL/L (ref 134–144)
WBC # BLD AUTO: 7.2 X10E3/UL (ref 3.4–10.8)

## 2019-08-15 LAB — HCV RNA SERPL QL NAA+PROBE: NEGATIVE

## 2019-09-04 ENCOUNTER — TELEPHONE (OUTPATIENT)
Dept: HEMATOLOGY | Age: 59
End: 2019-09-04

## 2019-09-04 NOTE — TELEPHONE ENCOUNTER
----- Message from [employer] sent at 9/3/2019  2:38 PM EDT -----  Regarding: Dr. Orozco Life  Patient would like his test results.  Contact is [phone]